# Patient Record
Sex: MALE | URBAN - METROPOLITAN AREA
[De-identification: names, ages, dates, MRNs, and addresses within clinical notes are randomized per-mention and may not be internally consistent; named-entity substitution may affect disease eponyms.]

---

## 2021-10-29 ENCOUNTER — OFFICE VISIT (OUTPATIENT)
Dept: URBAN - METROPOLITAN AREA CLINIC 75 | Facility: CLINIC | Age: 81
End: 2021-10-29
Payer: COMMERCIAL

## 2021-10-29 DIAGNOSIS — H53.2 DIPLOPIA: ICD-10-CM

## 2021-10-29 DIAGNOSIS — H43.811 VITREOUS DEGENERATION, RIGHT EYE: ICD-10-CM

## 2021-10-29 DIAGNOSIS — H35.3131 NONEXUDATIVE AGE-RELATED MACULAR DEGENERATION, BILATERAL, EARLY DRY STAGE: ICD-10-CM

## 2021-10-29 DIAGNOSIS — H25.813 COMBINED FORMS OF AGE-RELATED CATARACT, BILATERAL: Primary | ICD-10-CM

## 2021-10-29 DIAGNOSIS — H52.4 PRESBYOPIA: ICD-10-CM

## 2021-10-29 PROCEDURE — 99204 OFFICE O/P NEW MOD 45 MIN: CPT | Performed by: OPTOMETRIST

## 2021-10-29 ASSESSMENT — VISUAL ACUITY
OS: 20/25
OD: 20/20

## 2021-10-29 ASSESSMENT — INTRAOCULAR PRESSURE
OD: 19
OS: 18

## 2021-10-29 NOTE — IMPRESSION/PLAN
Impression: Nonexudative age-related macular degeneration, bilateral, early dry stage: H35.3131-The clinical exam is consistent with dry age-related macular degeneration. Plan: The diagnosis, natural history, and prognosis of dry AMD were discussed. The patient was instructed to begin taking AREDS like eye vitamins. The patient was instructed to call our office immediately upon any decreased vision.